# Patient Record
Sex: FEMALE | Race: WHITE | NOT HISPANIC OR LATINO | Employment: UNEMPLOYED | URBAN - METROPOLITAN AREA
[De-identification: names, ages, dates, MRNs, and addresses within clinical notes are randomized per-mention and may not be internally consistent; named-entity substitution may affect disease eponyms.]

---
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
## 2024-07-30 ENCOUNTER — LAB VISIT (OUTPATIENT)
Dept: LAB | Facility: HOSPITAL | Age: 19
End: 2024-07-30
Attending: PEDIATRICS
Payer: COMMERCIAL

## 2024-07-30 DIAGNOSIS — R42 POSTURAL DIZZINESS WITH NEAR SYNCOPE: Primary | ICD-10-CM

## 2024-07-30 DIAGNOSIS — R55 POSTURAL DIZZINESS WITH NEAR SYNCOPE: Primary | ICD-10-CM

## 2024-07-30 LAB
ALBUMIN SERPL BCP-MCNC: 4.3 G/DL (ref 3.5–5.2)
ALP SERPL-CCNC: 46 U/L (ref 50–130)
ALT SERPL W/O P-5'-P-CCNC: 13 U/L (ref 10–44)
ANION GAP SERPL CALC-SCNC: 13 MMOL/L (ref 8–16)
AST SERPL-CCNC: 28 U/L (ref 15–46)
BASOPHILS # BLD AUTO: 0.05 K/UL (ref 0–0.2)
BASOPHILS NFR BLD: 1.4 % (ref 0–1.9)
BILIRUB SERPL-MCNC: 1.1 MG/DL (ref 0.1–1)
CALCIUM SERPL-MCNC: 9.3 MG/DL (ref 8.7–10.5)
CHLORIDE SERPL-SCNC: 102 MMOL/L (ref 95–110)
CO2 SERPL-SCNC: 27 MMOL/L (ref 23–29)
CREAT SERPL-MCNC: 0.78 MG/DL (ref 0.5–1.4)
DIFFERENTIAL METHOD BLD: ABNORMAL
EOSINOPHIL # BLD AUTO: 0.1 K/UL (ref 0–0.5)
EOSINOPHIL NFR BLD: 3.7 % (ref 0–8)
ERYTHROCYTE [DISTWIDTH] IN BLOOD BY AUTOMATED COUNT: 12.3 % (ref 11.5–14.5)
EST. GFR  (NO RACE VARIABLE): >60 ML/MIN/1.73 M^2
GLUCOSE SERPL-MCNC: 82 MG/DL (ref 70–110)
HCT VFR BLD AUTO: 35.8 % (ref 37–48.5)
HGB BLD-MCNC: 11.6 G/DL (ref 12–16)
IMM GRANULOCYTES # BLD AUTO: 0.01 K/UL (ref 0–0.04)
IMM GRANULOCYTES NFR BLD AUTO: 0.3 % (ref 0–0.5)
LYMPHOCYTES # BLD AUTO: 1.6 K/UL (ref 1–4.8)
LYMPHOCYTES NFR BLD: 43.8 % (ref 18–48)
MCH RBC QN AUTO: 29.2 PG (ref 27–31)
MCHC RBC AUTO-ENTMCNC: 32.4 G/DL (ref 32–36)
MCV RBC AUTO: 90 FL (ref 82–98)
MONOCYTES # BLD AUTO: 0.3 K/UL (ref 0.3–1)
MONOCYTES NFR BLD: 9.6 % (ref 4–15)
NEUTROPHILS # BLD AUTO: 1.5 K/UL (ref 1.8–7.7)
NEUTROPHILS NFR BLD: 41.2 % (ref 38–73)
NRBC BLD-RTO: 0 /100 WBC
PLATELET # BLD AUTO: 137 K/UL (ref 150–450)
PMV BLD AUTO: 11.6 FL (ref 9.2–12.9)
POTASSIUM SERPL-SCNC: 4.5 MMOL/L (ref 3.5–5.1)
PROT SERPL-MCNC: 7.5 G/DL (ref 6–8.4)
RBC # BLD AUTO: 3.97 M/UL (ref 4–5.4)
SODIUM SERPL-SCNC: 142 MMOL/L (ref 136–145)
UUN UR-MCNC: 12 MG/DL (ref 7–17)
WBC # BLD AUTO: 3.54 K/UL (ref 3.9–12.7)

## 2024-07-30 PROCEDURE — 85025 COMPLETE CBC W/AUTO DIFF WBC: CPT | Mod: PN | Performed by: PEDIATRICS

## 2024-07-30 PROCEDURE — 36415 COLL VENOUS BLD VENIPUNCTURE: CPT | Mod: PN | Performed by: PEDIATRICS

## 2024-07-30 PROCEDURE — 80053 COMPREHEN METABOLIC PANEL: CPT | Mod: PN | Performed by: PEDIATRICS

## 2025-07-22 ENCOUNTER — TELEPHONE (OUTPATIENT)
Dept: INFECTIOUS DISEASES | Facility: CLINIC | Age: 20
End: 2025-07-22

## 2025-07-22 ENCOUNTER — OFFICE VISIT (OUTPATIENT)
Dept: INFECTIOUS DISEASES | Facility: CLINIC | Age: 20
End: 2025-07-22

## 2025-07-22 VITALS
BODY MASS INDEX: 21.77 KG/M2 | TEMPERATURE: 98 F | WEIGHT: 115.31 LBS | SYSTOLIC BLOOD PRESSURE: 85 MMHG | HEART RATE: 72 BPM | HEIGHT: 61 IN | DIASTOLIC BLOOD PRESSURE: 49 MMHG

## 2025-07-22 DIAGNOSIS — Z71.84 TRAVEL ADVICE ENCOUNTER: Primary | ICD-10-CM

## 2025-07-22 PROCEDURE — 99402 PREV MED CNSL INDIV APPRX 30: CPT | Mod: ,,, | Performed by: PHYSICIAN ASSISTANT

## 2025-07-22 PROCEDURE — 99999 PR PBB SHADOW E&M-EST. PATIENT-LVL III: CPT | Mod: PBBFAC,,, | Performed by: PHYSICIAN ASSISTANT

## 2025-07-22 RX ORDER — IVERMECTIN 10 MG/G
CREAM TOPICAL
COMMUNITY

## 2025-07-22 RX ORDER — TRIAMCINOLONE ACETONIDE 1 MG/G
OINTMENT TOPICAL 2 TIMES DAILY
COMMUNITY

## 2025-07-22 RX ORDER — DESONIDE 0.5 MG/G
OINTMENT TOPICAL
COMMUNITY
Start: 2025-04-24

## 2025-07-22 RX ORDER — MELOXICAM 7.5 MG/1
7.5 TABLET ORAL
COMMUNITY
Start: 2025-05-19 | End: 2025-08-17

## 2025-07-22 RX ORDER — AZITHROMYCIN 500 MG/1
500 TABLET, FILM COATED ORAL DAILY
Qty: 3 TABLET | Refills: 0 | Status: SHIPPED | OUTPATIENT
Start: 2025-07-22

## 2025-07-22 RX ORDER — NORGESTIMATE AND ETHINYL ESTRADIOL 7DAYSX3 LO
1 KIT ORAL
COMMUNITY

## 2025-07-22 RX ORDER — RIZATRIPTAN BENZOATE 10 MG/1
TABLET ORAL
COMMUNITY

## 2025-07-22 RX ORDER — TRETINOIN 0.5 MG/G
LOTION TOPICAL NIGHTLY
COMMUNITY

## 2025-07-22 NOTE — PROGRESS NOTES
Travel Consult    Chief Complaint   Patient presents with    Travel Consult       Cyn Guerrero presents today for pretravel consultation. The patient will be traveling to [place] on August 19.  The patient will be at this destination for four months. She will be going to [facility]. She is unsure if she will be visiting outside rural/agricultural areas. The purpose of this trip is foreign study. She will be studying dance.  Patient denies recent fevers, chills or infectious illnesses. Denies hx of splenctomy. No history of immunosuppression.       No past medical history on file.    Review of patient's allergies indicates:   Allergen Reactions    Penicillins Hives       Immunization History   Administered Date(s) Administered    COVID-19 Vaccine 03/19/2021, 04/09/2021, 12/24/2021    COVID-19, mRNA, LNP-S, PF (Moderna) Ages 12+ 06/05/2025    HIB 01/19/2005, 03/18/2005, 05/16/2005, 12/07/2005    HPV 9-Valent 12/27/2018, 07/03/2019    Hepatitis A, Adult 06/05/2025    Hepatitis B 2004, 2004, 09/12/2005    Hepatitis B, Pediatric/Adolescent 2004, 2004, 09/12/2005    IPV 01/19/2005, 03/18/2005, 05/26/2006, 05/17/2010    MMR 03/09/2006, 05/17/2010    Meningococcal B, recombinant 12/27/2019, 07/28/2020    Meningococcal Conjugate (MCV4P) 02/02/2016, 12/11/2020    Pneumococcal Conjugate - 7 Valent 01/19/2005, 03/18/2005, 05/16/2005, 03/09/2006    Tdap 02/02/2016, 02/06/2025    Varicella 12/07/2005, 05/17/2010       ASSESSMENT: Pre-Travel clinic assessment    PLAN:  The Patient was provided with an extensive travel guidance packet which provides travel information specific to the patients itinerary.     The patient's immunization history was reviewed and, based on the patient's itinerary, immunizations were ordered.     -Infectious Disease risks:       Mosquito Borne pathogens:  Reviewed basic mosquito avoidance precautions including wearing long sleeve clothing and insect repellant. The patient was  instructed to purchase insect repellent containing DEET or [place] and apply according to repellent label instructions. This patient needs no Malaria prophylaxis for this itinerary. The JE vaccine was deferred today.        Food Borne pathogens:  Reviewed basic hand, food and water sanitation precautions.  Patient instructed to take hand  on their trip. The patient is up to date on the Hepatitis A vaccine. Oral typhoid capsules will be sent to her pharmacy. The patient was instructed to purchase Imodium over the counter to take in case diarrhea (without blood or fever) develops. Azithromycin was ordered for treatment if severe or bloody diarrhea develops and the patient was instructed on use and possible side effects.      Blood Borne Pathogens: Patient has received the hepatitis B vaccination series.       Routine:  Patient is up to date on Tdap and received all childhood vaccinations.    Environmental risks:   The patient's medical history was reviewed and the patient was counseled on:  Precautions against development of DVT during flight  Precautions to prevent exposure to rabies and seek treatment for possible exposures  Precautions against sun exposure  Personal and travel safety  Dietary precautions    The patient was instructed to contact us if problems develop after travel.    I have spent 30 minutes with the patient, all of which was face to face with greater than 50% spent counseling.

## 2025-07-22 NOTE — TELEPHONE ENCOUNTER
Source   Cyn Guerrero (Patient)    Subject   Cyn Guerrero (Patient)    Topic   Medications - Pharmacy      Summary   Pharmacy   Communication   Type:  Pharmacy Calling to Clarify an RX            Name of Caller:Scarlett with [facility] pharmacy      Pharmacy Name:                  [id number] - [place], [place] - [address] [facility]      [address] [facility] [address]      Phone: [phone] Fax: [phone]                  Prescription Name:mikehoid (VIVOTIF) DR capsule      What do they need to clarify?:Rx is not covered by pts Rx insurance.      Best Call Back Number:[phone]      Additional Information: Pharmacy asking if office would like to send rx to another pharmacy.        Patient Information    Patient Name Gender  SSN   Cyn Guerrero Female 2005 [id number]     Notes    Manisha Victor PA-C   2025  1:31 PM   Type: Message   Summary: Reply To Customer Service Request   It is typically not covered by insurance     Spoke to Scarlett from [facility] pharmacy. She stated their system won't let them process the medication through her Rx insurance only medical insurance. I let Scarlett know usually that medication is usually not covered by insurance. She said she would order it and it should be in by Thursday.    Called patient and spoke to Shari, the patient's mother. I informed her the medication is not covered and she would have to pay out of pocket. Shari said that was fine to go ahead and fill it.    Called [facility] pharmacy back and talked to Jaret. Informed him the patient is going to pay out of pocket and to fill medicine. Jaret understood.